# Patient Record
Sex: FEMALE | Race: WHITE | NOT HISPANIC OR LATINO | URBAN - METROPOLITAN AREA
[De-identification: names, ages, dates, MRNs, and addresses within clinical notes are randomized per-mention and may not be internally consistent; named-entity substitution may affect disease eponyms.]

---

## 2018-09-12 ENCOUNTER — EMERGENCY (EMERGENCY)
Facility: HOSPITAL | Age: 53
LOS: 1 days | Discharge: HOME | End: 2018-09-12
Admitting: EMERGENCY MEDICINE
Payer: SELF-PAY

## 2018-09-12 VITALS
DIASTOLIC BLOOD PRESSURE: 82 MMHG | TEMPERATURE: 98 F | RESPIRATION RATE: 16 BRPM | HEART RATE: 96 BPM | OXYGEN SATURATION: 99 % | SYSTOLIC BLOOD PRESSURE: 132 MMHG

## 2018-09-12 DIAGNOSIS — S80.02XA CONTUSION OF LEFT KNEE, INITIAL ENCOUNTER: ICD-10-CM
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
## 2018-09-12 DIAGNOSIS — S01.511A LACERATION WITHOUT FOREIGN BODY OF LIP, INITIAL ENCOUNTER: ICD-10-CM
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
## 2018-09-12 DIAGNOSIS — I10 ESSENTIAL (PRIMARY) HYPERTENSION: ICD-10-CM

## 2018-09-12 PROCEDURE — 73562 X-RAY EXAM OF KNEE 3: CPT | Mod: 26,LT

## 2018-09-12 PROCEDURE — 99283 EMERGENCY DEPT VISIT LOW MDM: CPT | Mod: 25

## 2018-09-12 RX ORDER — IBUPROFEN 200 MG
600 TABLET ORAL ONCE
Qty: 0 | Refills: 0 | Status: COMPLETED | OUTPATIENT
Start: 2018-09-12 | End: 2018-09-12

## 2018-09-12 RX ADMIN — Medication 1 TABLET(S): at 15:51

## 2018-09-12 RX ADMIN — Medication 600 MILLIGRAM(S): at 15:51

## 2018-09-12 NOTE — ED PROVIDER NOTE - DIAGNOSTIC INTERPRETATION
Interpreted by morena Friedman lt knee  x-ray, 3 views  No fracture, no dislocation (joint spaces grossly normal), no Foreign Body noted, soft tissue normal

## 2018-09-12 NOTE — ED PROVIDER NOTE - MEDICAL DECISION MAKING DETAILS
Pt. s/p mechanical fall, + inner macerated inner lip lac, tdap utd, abx, x- ray , pain meds, re-asses.

## 2018-09-12 NOTE — ED PROVIDER NOTE - OBJECTIVE STATEMENT
Pt. with PMH HTN, visiting from [place] , on a cruise trip. had a mechanical fall [address]. pt landed on knee b/l, struck her face. + chip tooth, lac to upper inner lip, no LOC, not on blood thinners, was able to ambulate after fall. tdap UTD. pt. c/o of lt knee pain , worse when walking, no Ha, no N/v, no fever/chills.

## 2018-09-12 NOTE — ED PROVIDER NOTE - ENMT, MLM
+ macerated lac to upper inner lip, + chip front tooth, no loose tooth, Airway patent, Nasal mucosa clear. Mouth with normal mucosa. Throat has no vesicles, no oropharyngeal exudates and uvula is midline, Tm b/l  no hemotympanum

## 2018-09-12 NOTE — ED ADULT TRIAGE NOTE - CHIEF COMPLAINT QUOTE
Patient reports tripped and fell on street. Has laceration to upper lip and chipped front tooth--no other lose teeth noted- Denies LOC, headache, or neck ache. Patient is visiting from [place]. Friends at stretcher side.
